# Patient Record
Sex: MALE | Race: WHITE | NOT HISPANIC OR LATINO
[De-identification: names, ages, dates, MRNs, and addresses within clinical notes are randomized per-mention and may not be internally consistent; named-entity substitution may affect disease eponyms.]

---

## 2020-06-22 PROBLEM — Z00.00 ENCOUNTER FOR PREVENTIVE HEALTH EXAMINATION: Status: ACTIVE | Noted: 2020-06-22

## 2021-04-15 ENCOUNTER — APPOINTMENT (OUTPATIENT)
Dept: ORTHOPEDIC SURGERY | Facility: CLINIC | Age: 21
End: 2021-04-15
Payer: COMMERCIAL

## 2021-04-15 VITALS
WEIGHT: 160 LBS | SYSTOLIC BLOOD PRESSURE: 134 MMHG | DIASTOLIC BLOOD PRESSURE: 75 MMHG | BODY MASS INDEX: 24.25 KG/M2 | HEIGHT: 68 IN | HEART RATE: 73 BPM

## 2021-04-15 DIAGNOSIS — S43.432A SUPERIOR GLENOID LABRUM LESION OF LEFT SHOULDER, INITIAL ENCOUNTER: ICD-10-CM

## 2021-04-15 PROCEDURE — 99072 ADDL SUPL MATRL&STAF TM PHE: CPT

## 2021-04-15 PROCEDURE — 99204 OFFICE O/P NEW MOD 45 MIN: CPT

## 2021-04-15 PROCEDURE — 73030 X-RAY EXAM OF SHOULDER: CPT | Mod: LT

## 2021-04-15 RX ORDER — MELOXICAM 7.5 MG/1
7.5 TABLET ORAL
Qty: 21 | Refills: 0 | Status: COMPLETED | COMMUNITY
Start: 2021-04-15 | End: 2021-05-06

## 2021-04-15 NOTE — HISTORY OF PRESENT ILLNESS
[Stable] : stable [___ mths] : [unfilled] month(s) ago [2] : an average pain level of 2/10 [Lifting] : worsened by lifting [Rest] : relieved by rest [de-identified] : ALYSSIA BAILEY is a 20 year male being seen for initial visit L shoulder pain. Patient states CHARLOTTE as lifting at the gym on 11/22/2020. He reports at the time of injury he was bench pressing. He reports that at the time of injury he felt a sharp pain over his L AC joint/shoulder blade region. Currently, he has the most pain performing dips at the gym and reaching with ADLs. Patient denies numbness and tingling to the extremities. He endorses occasional radiating pain to the L side of the neck. He denies use of OTC pain medications. He has not done any formal PT. Of note, he reports h/x of R shoulder torn labrum 2 years ago, along with biceps tendinitis, for which he was treated conservatively with a R shoulder cortisone injection. \par \par All chest work outs are hurting him and has stopped going to the gym for 2 months. This general rest has not helped him either.

## 2021-04-15 NOTE — DISCUSSION/SUMMARY
[de-identified] : ALYSSIA BAILEY is a 20 year male being seen for initial visit L shoulder pain. Patient states CHARLOTTE as lifting at the gym on 11/22/2020. He reports at the time of injury he was bench pressing. He reports that at the time of injury he felt a sharp pain over his L AC joint/shoulder blade region. Currently, he has the most pain performing dips at the gym and reaching with ADLs. Patient denies numbness and tingling to the extremities. He endorses occasional radiating pain to the L side of the neck. He denies use of OTC pain medications. He has not done any formal PT. Of note, he reports h/x of R shoulder torn labrum 2 years ago, along with biceps tendinitis, for which he was treated conservatively with a R shoulder cortisone injection by nicholas.\par \par Based on his clinical exam today, symptoms and mechanism of injury, I am concerned for a labrum tear. We had a thorough discussion regarding the nature of his pain, the pathophysiology, as well as all treatment options. Radiographs were taken today and review with the patient and also suggest an old grade II AC separation. Advanced imaging is required with an MRI to evaluate the labrum. Regarding his pain we sent him a prescription for Meloxicam to take as directed. He will refrain from PT due to it causing him more pain with HEP that he performed over the last 2 months that he was shown previously from a prior injury. We will see him back to discuss MRI results with a follow up appointment. He agrees with the above plan and all questions were answered.

## 2021-04-15 NOTE — PHYSICAL EXAM
[de-identified] : Physical Exam:\par General: Well appearing, no acute distress\par Neurologic: A&Ox3, No focal deficits\par Head: NCAT without abrasions, lacerations, or ecchymosis to head, face, or scalp\par Eyes: No scleral icterus, no gross abnormalities\par Respiratory: Equal chest wall expansion bilaterally, no accessory muscle use\par Lymphatic: No lymphadenopathy palpated\par Skin: Warm and dry\par Psychiatric: Normal mood and affect\par \par Left Shoulder\par ·	Inspection/Palpation: AC joint tenderness, no swelling or deformities\par ·	Range of Motion: full and painless in all planes, no crepitus; FF 0-170; ER at side 45; IR to T7\par ·	Strength: forward elevation in scapular plane 4-5, internal rotation 5-5, external rotation 5 out of 5, adduction 4-5 and abduction 4-5\par ·	Stability: load and shift test positive, apprehension test positive, relocation test positive, sulcus sign negative; Ariadna test [negative/positive], Jerk test [negative/positive]\par ·	Tests: Mcclure negative, Neer negative, negative drop arm test, bear hug test negative, Napolean sign negative, cross arm adduction positive, lift off sign negative, hornblowers sign negative, speeds test positive, Yergason's test positive, bicipital groove tenderness negative, Blue's Active Compression test positive, bicep's load II test positive\par \par \par Right Shoulder\par ·	Inspection/Palpation: no tenderness, swelling or deformities\par ·	Range of Motion: full and painless in all planes, no crepitus\par ·	Strength: forward elevation in scapular plane 5/5, internal rotation 5/5, external rotation 5/5, adduction 5/5 and abduction 5/5\par ·	Stability: no joint instability on provocative testing\par ·	Tests: Mcclure test negative, Neer sign negative, negative drop arm test secondary to pain, bear hug test negative, Napolean sign negative, cross arm adduction negative, lift off sign positive, hornblowers sign negative, speeds test negative, Yergason's test negative, no bicipital groove tenderness, Blue's Active Compression test negative [de-identified] : 4 views of L shoulder were performed today and available for me to review. Results were discussed with the patient. They demonstrate an old grade II AC separation, no f/x, dislocation or other deformity.

## 2021-04-24 ENCOUNTER — APPOINTMENT (OUTPATIENT)
Dept: MRI IMAGING | Facility: CLINIC | Age: 21
End: 2021-04-24
Payer: COMMERCIAL

## 2021-04-24 ENCOUNTER — OUTPATIENT (OUTPATIENT)
Dept: OUTPATIENT SERVICES | Facility: HOSPITAL | Age: 21
LOS: 1 days | End: 2021-04-24
Payer: COMMERCIAL

## 2021-04-24 DIAGNOSIS — M25.512 PAIN IN LEFT SHOULDER: ICD-10-CM

## 2021-04-24 DIAGNOSIS — Z00.8 ENCOUNTER FOR OTHER GENERAL EXAMINATION: ICD-10-CM

## 2021-04-24 PROCEDURE — 73221 MRI JOINT UPR EXTREM W/O DYE: CPT | Mod: 26,LT

## 2021-04-24 PROCEDURE — 73221 MRI JOINT UPR EXTREM W/O DYE: CPT

## 2021-04-28 ENCOUNTER — NON-APPOINTMENT (OUTPATIENT)
Age: 21
End: 2021-04-28

## 2021-05-06 ENCOUNTER — APPOINTMENT (OUTPATIENT)
Dept: ORTHOPEDIC SURGERY | Facility: CLINIC | Age: 21
End: 2021-05-06
Payer: COMMERCIAL

## 2021-05-06 DIAGNOSIS — M25.512 PAIN IN LEFT SHOULDER: ICD-10-CM

## 2021-05-06 DIAGNOSIS — S43.102A UNSPECIFIED DISLOCATION OF LEFT ACROMIOCLAVICULAR JOINT, INITIAL ENCOUNTER: ICD-10-CM

## 2021-05-06 PROCEDURE — 99442: CPT

## 2021-05-07 PROBLEM — M25.512 LEFT SHOULDER PAIN: Status: ACTIVE | Noted: 2021-04-15

## 2021-05-07 PROBLEM — S43.102A SEPARATION OF LEFT ACROMIOCLAVICULAR JOINT, INITIAL ENCOUNTER: Status: ACTIVE | Noted: 2021-04-15

## 2024-05-11 ENCOUNTER — NON-APPOINTMENT (OUTPATIENT)
Age: 24
End: 2024-05-11

## 2024-08-18 ENCOUNTER — NON-APPOINTMENT (OUTPATIENT)
Age: 24
End: 2024-08-18